# Patient Record
Sex: FEMALE | Race: WHITE | NOT HISPANIC OR LATINO | Employment: FULL TIME | URBAN - METROPOLITAN AREA
[De-identification: names, ages, dates, MRNs, and addresses within clinical notes are randomized per-mention and may not be internally consistent; named-entity substitution may affect disease eponyms.]

---

## 2017-01-19 ENCOUNTER — ALLSCRIPTS OFFICE VISIT (OUTPATIENT)
Dept: OTHER | Facility: OTHER | Age: 32
End: 2017-01-19

## 2017-06-09 ENCOUNTER — LAB REQUISITION (OUTPATIENT)
Dept: LAB | Facility: HOSPITAL | Age: 32
End: 2017-06-09
Payer: COMMERCIAL

## 2017-06-09 ENCOUNTER — ALLSCRIPTS OFFICE VISIT (OUTPATIENT)
Dept: OTHER | Facility: OTHER | Age: 32
End: 2017-06-09

## 2017-06-09 DIAGNOSIS — Z86.2 PERSONAL HISTORY OF DISEASES OF THE BLOOD AND BLOOD-FORMING ORGANS AND CERTAIN DISORDERS INVOLVING THE IMMUNE MECHANISM: ICD-10-CM

## 2017-06-09 DIAGNOSIS — R42 DIZZINESS AND GIDDINESS: ICD-10-CM

## 2017-06-09 LAB
ANION GAP SERPL CALCULATED.3IONS-SCNC: 10 MMOL/L (ref 4–13)
BASOPHILS # BLD AUTO: 0.02 THOUSANDS/ΜL (ref 0–0.1)
BASOPHILS NFR BLD AUTO: 0 % (ref 0–1)
BUN SERPL-MCNC: 14 MG/DL (ref 5–25)
CALCIUM SERPL-MCNC: 9.5 MG/DL (ref 8.3–10.1)
CHLORIDE SERPL-SCNC: 106 MMOL/L (ref 100–108)
CO2 SERPL-SCNC: 25 MMOL/L (ref 21–32)
CREAT SERPL-MCNC: 0.63 MG/DL (ref 0.6–1.3)
EOSINOPHIL # BLD AUTO: 0.17 THOUSAND/ΜL (ref 0–0.61)
EOSINOPHIL NFR BLD AUTO: 2 % (ref 0–6)
ERYTHROCYTE [DISTWIDTH] IN BLOOD BY AUTOMATED COUNT: 13.4 % (ref 11.6–15.1)
FERRITIN SERPL-MCNC: 19 NG/ML (ref 8–388)
GFR SERPL CREATININE-BSD FRML MDRD: >60 ML/MIN/1.73SQ M
GLUCOSE P FAST SERPL-MCNC: 91 MG/DL (ref 65–99)
HCT VFR BLD AUTO: 43.1 % (ref 34.8–46.1)
HGB BLD-MCNC: 14.5 G/DL (ref 11.5–15.4)
IRON SATN MFR SERPL: 11 %
IRON SERPL-MCNC: 45 UG/DL (ref 50–170)
IRON SERPL-MCNC: 45 UG/DL (ref 50–170)
LYMPHOCYTES # BLD AUTO: 1.89 THOUSANDS/ΜL (ref 0.6–4.47)
LYMPHOCYTES NFR BLD AUTO: 18 % (ref 14–44)
MCH RBC QN AUTO: 27.1 PG (ref 26.8–34.3)
MCHC RBC AUTO-ENTMCNC: 33.6 G/DL (ref 31.4–37.4)
MCV RBC AUTO: 81 FL (ref 82–98)
MONOCYTES # BLD AUTO: 0.86 THOUSAND/ΜL (ref 0.17–1.22)
MONOCYTES NFR BLD AUTO: 8 % (ref 4–12)
NEUTROPHILS # BLD AUTO: 7.48 THOUSANDS/ΜL (ref 1.85–7.62)
NEUTS SEG NFR BLD AUTO: 72 % (ref 43–75)
NRBC BLD AUTO-RTO: 0 /100 WBCS
PLATELET # BLD AUTO: 219 THOUSANDS/UL (ref 149–390)
PMV BLD AUTO: 11.3 FL (ref 8.9–12.7)
POTASSIUM SERPL-SCNC: 3.8 MMOL/L (ref 3.5–5.3)
RBC # BLD AUTO: 5.35 MILLION/UL (ref 3.81–5.12)
SODIUM SERPL-SCNC: 141 MMOL/L (ref 136–145)
TIBC SERPL-MCNC: 402 UG/DL (ref 250–450)
TSH SERPL DL<=0.05 MIU/L-ACNC: 1.57 UIU/ML (ref 0.36–3.74)
WBC # BLD AUTO: 10.45 THOUSAND/UL (ref 4.31–10.16)

## 2017-06-09 PROCEDURE — 83540 ASSAY OF IRON: CPT | Performed by: PHYSICIAN ASSISTANT

## 2017-06-09 PROCEDURE — 85025 COMPLETE CBC W/AUTO DIFF WBC: CPT | Performed by: PHYSICIAN ASSISTANT

## 2017-06-09 PROCEDURE — 82728 ASSAY OF FERRITIN: CPT | Performed by: PHYSICIAN ASSISTANT

## 2017-06-09 PROCEDURE — 80048 BASIC METABOLIC PNL TOTAL CA: CPT | Performed by: PHYSICIAN ASSISTANT

## 2017-06-09 PROCEDURE — 83550 IRON BINDING TEST: CPT | Performed by: PHYSICIAN ASSISTANT

## 2017-06-09 PROCEDURE — 84443 ASSAY THYROID STIM HORMONE: CPT | Performed by: PHYSICIAN ASSISTANT

## 2017-06-13 ENCOUNTER — GENERIC CONVERSION - ENCOUNTER (OUTPATIENT)
Dept: OTHER | Facility: OTHER | Age: 32
End: 2017-06-13

## 2017-06-13 LAB
ADDITIONAL INFORMATION (HISTORICAL): NORMAL
ADEQUACY: (HISTORICAL): NORMAL
COMMENT (HISTORICAL): NORMAL
CYTOTECHNOLOGIST: (HISTORICAL): NORMAL
GENERAL CATEGORIZATION (HISTORICAL): NORMAL
INFECTION (HISTORICAL): NORMAL
INTERPRETATION (HISTORICAL): NORMAL
LMP (HISTORICAL): NORMAL
PATHOLOGIST (HISTORICAL): NORMAL
PREV. BX: (HISTORICAL): NORMAL
PREV. PAP (HISTORICAL): NORMAL
REVIEWED BY (HISTORICAL): NORMAL
SOURCE (HISTORICAL): NORMAL
SPECIMEN STATUS REPORT (HISTORICAL): NORMAL

## 2017-06-16 ENCOUNTER — GENERIC CONVERSION - ENCOUNTER (OUTPATIENT)
Dept: OTHER | Facility: OTHER | Age: 32
End: 2017-06-16

## 2018-01-13 VITALS
DIASTOLIC BLOOD PRESSURE: 82 MMHG | RESPIRATION RATE: 17 BRPM | HEIGHT: 61 IN | WEIGHT: 157.25 LBS | BODY MASS INDEX: 29.69 KG/M2 | HEART RATE: 77 BPM | OXYGEN SATURATION: 99 % | TEMPERATURE: 99.1 F | SYSTOLIC BLOOD PRESSURE: 120 MMHG

## 2018-01-13 NOTE — RESULT NOTES
Verified Results  (1) CBC/PLT/DIFF [id number] 04:25PM Fernando Alegre Order Number: [id number]     Test Name Result Flag Reference   WBC COUNT 10 45 Thousand/uL H 4 31-10 16   RBC COUNT 5 35 Million/uL H 3 81-5 12   HEMOGLOBIN 14 5 g/dL  11 5-15 4   HEMATOCRIT 43 1 %  34 8-46  1   MCV 81 fL L 82-98   MCH 27 1 pg  26 8-34 3   MCHC 33 6 g/dL  31 4-37 4   RDW 13 4 %  11 6-15 1   MPV 11 3 fL  8 9-12 7   PLATELET COUNT 745 Thousands/uL  149-390   nRBC AUTOMATED 0 /100 WBCs     NEUTROPHILS RELATIVE PERCENT 72 %  43-75   LYMPHOCYTES RELATIVE PERCENT 18 %  14-44   MONOCYTES RELATIVE PERCENT 8 %  4-12   EOSINOPHILS RELATIVE PERCENT 2 %  0-6   BASOPHILS RELATIVE PERCENT 0 %  0-1   NEUTROPHILS ABSOLUTE COUNT 7 48 Thousands/? ??L  1 85-7 62   LYMPHOCYTES ABSOLUTE COUNT 1 89 Thousands/? ??L  0 60-4 47   MONOCYTES ABSOLUTE COUNT 0 86 Thousand/? ??L  0 17-1 22   EOSINOPHILS ABSOLUTE COUNT 0 17 Thousand/? ??L  0 00-0 61   BASOPHILS ABSOLUTE COUNT 0 02 Thousands/? ??L  0 00-0 10     (1) IRON SATURATION %, TIBC [id number] 04:25PM Fernando Alegre Order Number: [id number]     Test Name Result Flag Reference   IRON SATURATION 11 %     TOTAL IRON BINDING CAPACITY 402 ug/dL  250-450   IRON 45 ug/dL L    Patients treated with metal-binding drugs (ie  Deferoxamine) may have depressed iron values  (1) IRON [id number] 04:25PM Fernando Alegre Order Number: [id number]     Test Name Result Flag Reference   IRON 45 ug/dL L    Patients treated with metal-binding drugs (ie  Deferoxamine) may have depressed iron values  Patients treated with metal-binding drugs (ie  Deferoxamine) may have depressed iron values       (1) FERRITIN [id number] 04:25PM Fernando Alegre Order Number: [id number]     Test Name Result Flag Reference   FERRITIN 19 ng/mL  8-388     (1) TSH WITH FT4 REFLEX [id number] 04:25PM Fernando Alegre Order Number: [id number]     Test Name Result Flag Reference   TSH 1 570 uIU/mL  0 358-3 740   Patients undergoing fluorescein dye angiography may retain small amounts of fluorescein in the body for 48-72 hours post procedure  Samples containing fluorescein can produce falsely depressed TSH values  If the patient had this procedure,a specimen should be resubmitted post fluorescein clearance  The recommended reference ranges for TSH during pregnancy are as follows:  First trimester 0 1 to 2 5 uIU/mL  Second trimester  0 2 to 3 0 uIU/mL  Third trimester 0 3 to 3 0 uIU/m     (1) BASIC METABOLIC PROFILE [id number] 04:25PM [employer] Order Number: [id number]     Test Name Result Flag Reference   SODIUM 141 mmol/L  136-145   POTASSIUM 3 8 mmol/L  3 5-5 3   CHLORIDE 106 mmol/L  100-108   CARBON DIOXIDE 25 mmol/L  21-32   ANION GAP (CALC) 10 mmol/L  4-13   BLOOD UREA NITROGEN 14 mg/dL  5-25   CREATININE 0 63 mg/dL  0 60-1 30   Standardized to IDMS reference method   CALCIUM 9 5 mg/dL  8 3-10 1   eGFR Non-African American      >60 0 ml/min/1 73sq m   [facility] [employer] Disease Education Program recommendations are as follows:  GFR calculation is accurate only with a steady state creatinine  Chronic Kidney disease less than 60 ml/min/1 73 sq  meters  Kidney failure less than 15 ml/min/1 73 sq  meters     GLUCOSE FASTING 91 mg/dL  65-99       Plan  Low iron    · Ferrous Sulfate 325 (65 Fe) MG Oral Tablet; TAKE 1 TABLET DAILY

## 2018-01-15 VITALS
HEART RATE: 113 BPM | DIASTOLIC BLOOD PRESSURE: 70 MMHG | BODY MASS INDEX: 28.33 KG/M2 | RESPIRATION RATE: 16 BRPM | OXYGEN SATURATION: 98 % | TEMPERATURE: 99.4 F | SYSTOLIC BLOOD PRESSURE: 122 MMHG | HEIGHT: 61 IN | WEIGHT: 150.06 LBS

## 2018-04-25 RX ORDER — FERROUS SULFATE 325(65) MG
1 TABLET ORAL DAILY
COMMUNITY
Start: 2017-06-16 | End: 2018-10-10 | Stop reason: ALTCHOICE

## 2018-04-25 RX ORDER — MECLIZINE HYDROCHLORIDE 25 MG/1
25 TABLET ORAL EVERY 8 HOURS PRN
COMMUNITY
Start: 2017-06-09 | End: 2018-10-10 | Stop reason: ALTCHOICE

## 2018-10-10 ENCOUNTER — OFFICE VISIT (OUTPATIENT)
Dept: FAMILY MEDICINE CLINIC | Facility: CLINIC | Age: 33
End: 2018-10-10
Payer: COMMERCIAL

## 2018-10-10 VITALS
BODY MASS INDEX: 31.79 KG/M2 | WEIGHT: 161.9 LBS | HEIGHT: 60 IN | HEART RATE: 77 BPM | RESPIRATION RATE: 16 BRPM | SYSTOLIC BLOOD PRESSURE: 122 MMHG | DIASTOLIC BLOOD PRESSURE: 70 MMHG | OXYGEN SATURATION: 98 % | TEMPERATURE: 98 F

## 2018-10-10 DIAGNOSIS — L30.9 ECZEMA, UNSPECIFIED TYPE: Primary | ICD-10-CM

## 2018-10-10 PROCEDURE — 1036F TOBACCO NON-USER: CPT | Performed by: PHYSICIAN ASSISTANT

## 2018-10-10 PROCEDURE — 99213 OFFICE O/P EST LOW 20 MIN: CPT | Performed by: PHYSICIAN ASSISTANT

## 2018-10-10 NOTE — PROGRESS NOTES
Assessment/Plan:      Diagnoses and all orders for this visit:    Eczema, unspecified type  -     Crisaborole (EUCRISA) 2 % OINT; Apply topically 2 (two) times a day        Patient is a 81-year-old female presenting today for discussion of her eczema  She has had eczema the past with flares  However the past 6 months to a year she has noticed frequent flares on her hands  She states they  Could become itchy and painful  She has use hydrocortisone over-the-counter in the past but still takes a while for the flare ups to improve  I will try her on topical Eucrisa, risks vs benefits of use versus using topical steroids were discussed, she will apply topical twice a day to affected areas  She will call with any coverage issues or lack of treatment response  Chief Complaint   Patient presents with    Eczema     Patient presents for Eczema in between fingers  Patient states it has gone worse and is unsure if she needs something stronger then OTC medication  Subjective:     Patient ID: Cherie Members is a 29 y o  female  34y/o female here today for discussion of eczema  She has a hx sometimes on elbows, etc  However past 6 months getting on hands, sometimes between fingers, painful and itchy  She continues with frequent flare ups  She has used OTC hydrocortisone before which helps but not strong enough  Nearing end of flare today  Review of Systems   Constitutional: Negative  Skin:        As in HPI         The following portions of the patient's history were reviewed and updated as appropriate: allergies, current medications, past family history, past medical history, past social history, past surgical history and problem list       Objective:     Physical Exam   Constitutional: She appears well-developed and well-nourished     Skin:   B/L hands with mild dry, excoriated erythematous patches, some on sides of some fingers and between some of the fingers   Psychiatric: She has a normal mood and affect  Vitals reviewed        Vitals:    10/10/18 1635   BP: 122/70   BP Location: Left arm   Patient Position: Sitting   Cuff Size: Standard   Pulse: 77   Resp: 16   Temp: 98 °F (36 7 °C)   TempSrc: Tympanic   SpO2: 98%   Weight: 73 4 kg (161 lb 14 4 oz)   Height: 5' 0 24" (1 53 m)

## 2018-10-12 ENCOUNTER — TELEPHONE (OUTPATIENT)
Dept: FAMILY MEDICINE CLINIC | Facility: CLINIC | Age: 33
End: 2018-10-12

## 2018-10-12 NOTE — TELEPHONE ENCOUNTER
PTS INSURANCE DENIED EUCRISA  THEY WILL COVER TACROLUMUS OR PIMECROLIMUS  SHE WOULD NEED A 4 WEEK TRIAL OF ONE OF THESE  WHAT DO YOU WANT TO DO?   [facility]

## 2018-10-15 ENCOUNTER — TELEPHONE (OUTPATIENT)
Dept: FAMILY MEDICINE CLINIC | Facility: CLINIC | Age: 33
End: 2018-10-15

## 2018-10-15 DIAGNOSIS — L30.9 ECZEMA OF BOTH HANDS: Primary | ICD-10-CM

## 2018-10-15 RX ORDER — TRIAMCINOLONE ACETONIDE 5 MG/G
CREAM TOPICAL 2 TIMES DAILY
Qty: 454 G | Refills: 1 | Status: SHIPPED | OUTPATIENT
Start: 2018-10-15

## 2018-10-15 NOTE — TELEPHONE ENCOUNTER
Pt called into the office, she was waiting for prior auth on Eucrasia, but her insurance denied that RX per Gerber Drummond  However there are two that Gerber Drummond listed that will be covered Tacrolimus or Pimecrolimus  Pt will be using the [facility] pharmacy  Please advise  She is agreeing with one of the 2 prescriptions  Thank you!

## 2018-10-15 NOTE — TELEPHONE ENCOUNTER
I dont have any experience using these  I would use a topical steroid for her hands before I would use those  Please tell pt I will send topical triamcinolone steroid cream (a stronger potency steroid cream) to her [facility] pharmacy for her

## 2020-03-13 ENCOUNTER — VIRTUAL VISIT (OUTPATIENT)
Dept: FAMILY MEDICINE | Facility: OTHER | Age: 35
End: 2020-03-13

## 2020-03-13 NOTE — PROGRESS NOTES
"Date: 2020 07:39:11  Clinician: Danielle Barragan  Clinician NPI: [id number]  Patient: Milana Banerjee  Patient : 1985  Patient Address: [address] [place] [id number]  Patient Phone: [phone]  Visit Protocol: URI  Patient Summary:  Milana is a 34 year old ( : 1985 ) female who initiated a Visit for COVID-19 (Coronavirus) evaluation and screening. When asked the question \"Please sign me up to receive news, health information and promotions from [facility].\", Milana responded \"Yes\".    Milana states her symptoms started 1-2 days ago.   Her symptoms consist of malaise, a sore throat, a cough, nasal congestion, a headache, chills, and myalgia. Milana also feels feverish.   Symptom details     Nasal secretions: The color of her mucus is clear.    Cough: Milana coughs every 5-10 minutes and her cough is not more bothersome at night. Phlegm does not come into her throat when she coughs. She does not believe her cough is caused by post-nasal drip.     Sore throat: Milana reports having moderate throat pain (4-6 on a 10 point pain scale), does not have exudate on her tonsils, and can swallow liquids. She is not sure if the lymph nodes in her neck are enlarged. A rash has not appeared on the skin since the sore throat started.     Temperature: Her current temperature is 100.7 degrees Fahrenheit. Milana has had a temperature over 100 degrees Fahrenheit for 1-2 days.     Headache: She states the headache is mild (1-3 on a 10 point pain scale).      Milana denies having rhinitis, wheezing, ear pain, teeth pain, and facial pain or pressure. She also denies taking antibiotic medication for the symptoms, having recent facial or sinus surgery in the past 60 days, and having a sinus infection within the past year. She is not experiencing dyspnea.   Precipitating events  Within the past week, Milana has not been exposed to someone with strep throat. She has not recently been exposed to someone with " influenza. Milana has been in close contact with the following high risk individuals: children under the age of 5.   Pertinent COVID-19 (Coronavirus) information  Milana has not traveled internationally in the last 14 days before the start of her symptoms.   Milana has not had close contact with a laboratory confirmed positive COVID-19 patient within 14 days of symptom onset. 100.7   Pertinent medical history  Milana does not get yeast infections when she takes antibiotics.   Milana does not need a return to work/school note.   Weight: 140 lbs   Milana does not smoke or use smokeless tobacco.   She denies pregnancy and is breastfeeding. She has menstruated in the past month.   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 My symptoms started Wednesday early day. My cough is awful. My chest burns. I have the chills and a sore throat. My daughter age 4 woke up this morning with a 100.2 fever and slight cough. My son age 18 months seems out of sorts but no symptoms yet.   Weight: 140 lbs    MEDICATIONS: Tylenol Extra Strength oral, ALLERGIES: NKDA  Clinician Response:  Dear Milana,  Based on the information provided, you have influenza (the flu). The flu is a very contagious infection that can lead to a serious illness in anyone, but some are at risk for becoming more sick than others. For this reason, it is important to know you have the flu so you can take steps to prevent spreading it to others.  Medication information  I am prescribing:     Oseltamivir (Tamiflu) 75 mg oral capsule. Take 1 capsule by mouth 2 times per day for 5 days. There are no refills with this prescription.   Self care  The following tips will keep you as comfortable as possible while you recover:     Rest    Drink plenty of water and other  liquids    Take a hot shower to loosen congestion    Use throat lozenges    Gargle with warm salt water (1/4 teaspoon of salt per 8 ounce glass of water)    Suck on frozen items such as popsicles or ice cubes    Drink hot tea with lemon and honey    Take a spoonful of honey to reduce your cough     If you have a fever, stay home until your temperature has returned to normal for 24 hours and you feel well enough for daily activities. And of course, wash your hands often to prevent spreading the flu and other illnesses. However, the best way to prevent the flu is to get a flu shot before each flu season.  When to seek care  Please be seen in a clinic or urgent care if new symptoms develop, or symptoms become worse.  Call 911 or go to the emergency room if you feel that your throat is closing off, you suddenly develop a rash, you are unable to swallow fluids, you are drooling, or you are having difficulty breathing.  COVID-19 (Coronavirus) General Information  We understand it may be concerning to be ill with symptoms that overlap with COVID-19 (Coronavirus) symptoms. Below are some helpful information on COVID-19 (Coronavirus).  How can I protect myself and others from the COVID-19 (Coronavirus)?  Because there is currently no vaccine to prevent infection, the best way to protect yourself is to avoid being exposed to this virus. The CDC recommends the following additional steps:     Wash your hands often with soap and water for at least 20 seconds, especially after blowing your nose, coughing, or sneezing; going to the bathroom; and before eating or preparing food.  Use an alcohol-based hand  that contains at least 60 percent alcohol if soap and water are not available.        Avoid touching your eyes, nose and mouth with unwashed hands.    Avoid close contact with people who are sick.    Stay home when you are sick.    Cover your cough or sneeze with a tissue, then throw the tissue in the trash.    Clean and  disinfect frequently touched objects and surfaces.     You can help stop COVID-19 (Coronavirus) by knowing the signs and symptoms:     Fever    Cough    Shortness of breath     Contact your healthcare provider if   Develop symptoms   AND   Have been in close contact with a person known to have COVID-19 (Coronavirus) or live in or have recently traveled from an area with ongoing spread of COVID-19 (Coronavirus). Call ahead before you go to a doctor's office or emergency room. Tell them about your recent travel and your symptoms.   For the most up to date information, visit the CDC's website.  Steps to help prevent the spread of COVID-19 (Coronavirus) if you are sick  If you are sick with COVID-19 (Coronavirus) or suspect you are infected with the virus that causes COVID-19 (Coronavirus), follow the steps below to help prevent the disease from spreading&nbsp;to people in your home and community.     Stay home except to get medical care. Home isolation may be started in consultation with your healthcare clinician.    Separate yourself from other people and animals in your home.    Call ahead before visiting your doctor if you have a medical appointment.    Wear a facemask when you are around other people.    Cover your cough and sneezes.    Clean your hands often.    Avoid sharing personal household items.    Clean and disinfect frequently touched objects and surfaces everyday.    You will need to have someone drop off medications or household supplies (if needed) at your house without coming inside or in contact with you or others living in your house.    Monitor your symptoms and seek prompt medical care if your illness is worsening (e.g. Difficulty breathing).    Discontinue home isolation only in consultation with your healthcare provider.     For more detailed and up to date information on what to do if you are sick, visit this link: What to Do If You Are Sick With Coronavirus Disease 2019 (COVID-19).  Do I need  "to be tested for COVID-19 (Coronavirus)?     At this time, the limited number of tests available are controlled by the state and local health departments and are being reserved for more seriously ill patients, those with known exposure to confirmed patients, and those with recent travel (within 14 days) to countries with high rates of COVID-19 (Coronavirus).    Decisions on which patients receive testing will be based on the local spread of COVID-19 (Coronavirus) as well as the symptoms. Your healthcare provider will make the final decision on whether you should be tested.    In the meantime, if you have concerns that you may have been exposed, it is reasonable to practice \"social distancing.\"&nbsp; If you are ill with a cold or flu like illness, please monitor your symptoms and reach out to your healthcare provider if your symptoms worsen.    For more up to date information, visit this link: COVID-19 (Coronavirus) Frequently Asked Questions and Answers.      Diagnosis: Influenza  Diagnosis ICD: J11.1  Prescription: oseltamivir (Tamiflu) 75 mg oral capsule 10 capsule, 5 days supply. Take 1 capsule by mouth 2 times per day for 5 days. Refills: 0, Refill as needed: no, Allow substitutions: yes  Pharmacy: [id number] - [phone] - [address] [place] [address]  "

## 2023-07-05 ENCOUNTER — TRANSCRIBE ORDERS (OUTPATIENT)
Dept: OTHER | Age: 38
End: 2023-07-05

## 2023-07-05 DIAGNOSIS — M54.2 CERVICAL PAIN: Primary | ICD-10-CM

## 2023-08-03 ENCOUNTER — THERAPY VISIT (OUTPATIENT)
Dept: PHYSICAL THERAPY | Facility: CLINIC | Age: 38
End: 2023-08-03
Attending: PHYSICIAN ASSISTANT
Payer: COMMERCIAL

## 2023-08-03 DIAGNOSIS — M54.2 CERVICAL PAIN: ICD-10-CM

## 2023-08-03 DIAGNOSIS — M54.2 NECK PAIN: Primary | ICD-10-CM

## 2023-08-03 PROCEDURE — 97140 MANUAL THERAPY 1/> REGIONS: CPT | Mod: GP | Performed by: PHYSICAL THERAPIST

## 2023-08-03 PROCEDURE — 97110 THERAPEUTIC EXERCISES: CPT | Mod: GP | Performed by: PHYSICAL THERAPIST

## 2023-08-03 PROCEDURE — 97161 PT EVAL LOW COMPLEX 20 MIN: CPT | Mod: GP | Performed by: PHYSICAL THERAPIST

## 2023-08-03 NOTE — PROGRESS NOTES
"PHYSICAL THERAPY EVALUATION  Type of Visit: Evaluation    See electronic medical record for Abuse and Falls Screening details.    Subjective       Presenting condition or subjective complaint: Neck pain and headaches.  Date of onset:  (Ongoing for past 2 years.)    Relevant medical history:     Dates & types of surgery:      Prior diagnostic imaging/testing results: -- (None)     Prior therapy history for the same diagnosis, illness or injury: Yes Chiropractor a couple of times.  Temporary relief.    Prior Level of Function  Transfers: Independent  Ambulation: Independent  ADL: Independent    Living Environment  Social support: With a significant other or spouse   Type of home: House   Stairs to enter the home: No       Ramp: No   Stairs inside the home: Yes 8 Is there a railing: Yes   Help at home: None  Equipment owned:       Employment: Yes [employer]  Hobbies/Interests: Walking, reading, playing with kids.    Patient goals for therapy: Just not have the pain. Exercise.    Pain assessment: Location: R neck and upper trapezius area/Rating: Currently 1-2/10, increases to 5/10     Objective   CERVICAL SPINE EVALUATION  PAIN: Pain Level at Rest: 2/10  Pain Level with Use: 5/10  Pain Location: cervical spine  Pain Quality: Burning, Nagging, and tightness.  Reports upper body \"feels crunched\"  Pain Frequency: Symptoms constant with intermittent flare ups  Pain is Worst: Symptoms worsen as the day progresses  Pain is Exacerbated By: Patient reports pain increases with activity throughout the day.  Notices pain with exercise and turing head while driving.  Nothing specific seems to increase symptoms.  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  Describes symptoms as tightness, the head feeling heavy, and the upper body feeling crunched.    Pain quality: tightness  Paresthesia (yes/no):  no    Symptoms (improving/unchanging/worsening):  worsening  Symptoms commenced as a result of: unknown   Condition occurred in the following environment:  home    Symptoms at onset (neck/arm/forearm/headache):   Constant symptoms (neck/arm/forearm/headache): neck  Intermittent symptoms (neck/arm/forearm/headache): head, upper back    Symptoms are made worse with the following: Patient reports no specific pattern to symptoms except they increase with activity as the day progresses.  Notices symptoms and strain when exercising and driving, but does not think these activities increase or bring the symptoms on.   Symptoms are made better with the following: Heat provides temporary relief at the end of the day.    Disturbed sleep (yes/no): no  Number of pillows: 1  Sleeping postures (prone/sup/side R/L):     Previous episodes (0/1-5/6-10/11+): 0 Year of first episode: about 2 years ago    Previous history: no history of injury or previous symptoms  Previous treatments: a few chiropractic visits, temporary relief    Specific Questions: (as reported by the patient)  Dizziness/Tinnitus/Nausea/Swallowing (pos/neg): no  Gait/Upper Limbs (normal/abnormal): normal  Medications (nil/NSAIDS/anlag/steroids/anticoag/other):  None  Medical allergies:  none  General health (excellent/good/fair/poor):  Good  Pertinent medical history:  None  Imaging (None/Xray/MRI/Other):  none  Recent or major surgery (yes/no): no  Night pain (yes/no): no  Accidents  (yes/no): no  Unexplained weight loss (yes/no): no  Barriers at home: no  Other red flags: no    Postural Observation:   Sitting: Slump  Protruded head: Yes Lateral deviation:  Nil.  Change of posture: Better Wry Neck: Nil  Other observations/functional baselines:      Neurological:  Motor Deficit:  UE equal B  Reflexes:    Sensory Deficit:     Neurodynamic tests:      Movement Loss:   Erlin Mod Min Nil Symptoms   Protrusion    x NE   Flexion    x B tightness   Retraction   x  Tightness R>L   Extension    x NE   Lateral flexion R  x   Pn on R, tight on L   Lateral flexion L    x Tight on R   Rotation R   x  Tight on R   Rotation L   x  Tight on R     Test Movements:   During: produces, abolishes, increases, decreases, no effect, centralizing, peripheralizing  After: better, worse, no better, no worse, no effect, centralized, peripheralized    Symptomatic response Mechanical response    During testing After testing Effect - increased ROM, decreased ROM, or key functional test No Effect   Pretest symptoms sittin-2/10 pain R neck     Rep PRO          Rep RET Increases    Worse, B soreness/burning    Inc R SB ROM, inc ease with rotation    Rep RET EXT No Effect    No Effect    NE             Pretest symptoms lying:     During testing After testing Effect - increased ROM, decreased ROM, or key functional test No Effect   Rep RET         Rep RET EXT           If required, pretest symptoms sitting:        During testing After testing Effect - increased ROM, decreased ROM, or key functional test No Effect   Rep LF-R         Rep LF-L         Rep ROT-R         Rep ROT-L         Rep FLEX           Static Tests:      Other Tests: Weakness present in middle and lower trapezius B, 3/5.    Provisional Classification:      Potential Drivers of Pain and/or Disability:     Principle of Management:  Education:  Posture    Equipment provided:    Mechanical therapy (Y/N):  y   Extension principle:  seated ret, seated  ret/ext     Lateral principle:    Flexion principle:       Other:    MYOTOMES: WNL  DTR S:   CORD SIGNS:   DERMATOMES:   NEURAL TENSION:   FLEXIBILITY: WNL   SPECIAL TESTS:   PALPATION:   + Tenderness At Location Left Right   Sternocleidomastoid + +   Scalenes     Rhomboids     Facet     Upper Trap - +   Levator + +   Erector Spinae + +   Suboccipitals + +     SPINAL SEGMENTAL CONCLUSIONS:       Assessment & Plan   CLINICAL IMPRESSIONS  Medical Diagnosis: Neck pain    Treatment Diagnosis: Neck pain   Impression/Assessment: Patient is a 37 year old female with neck complaints.  The following significant findings have been identified: Pain, Decreased ROM/flexibility, Decreased strength, and Decreased activity tolerance. These impairments interfere with their ability to perform recreational activities and driving  as compared to previous level of function.     Clinical Decision Making (Complexity):  Clinical Presentation: Stable/Uncomplicated  Clinical Presentation Rationale: based on medical and personal factors listed in PT evaluation  Clinical Decision Making (Complexity): Low complexity    PLAN OF CARE  Treatment Interventions:  Interventions: Manual Therapy, Therapeutic Activity, Therapeutic Exercise, Self-Care/Home Management    Long Term Goals     PT Goal 1  Goal Identifier: Driving  Goal Description: Patient will be able to drive 15-20 minutes as needed for errands with 0/10 pain  Rationale: to maximize safety and independence with transportation;to maximize safety and independence within the community  Goal Progress: Patient reports up to 5/10 with driving and turning head while driving to complete errands.  Target Date: 09/28/23      Frequency of Treatment: 1x/wk  Duration of Treatment: 8 weeks    Recommended Referrals to Other Professionals:   Education Assessment:   Learner/Method: Patient;Pictures/Video;Demonstration;Listening    Risks and benefits of evaluation/treatment have been explained.    Patient/Family/caregiver agrees with Plan of Care.     Evaluation Time:     PT Pily, Low Complexity Minutes (87600): 15       Signing Clinician: Jennifer Acuña PT

## 2023-08-10 ENCOUNTER — THERAPY VISIT (OUTPATIENT)
Dept: PHYSICAL THERAPY | Facility: CLINIC | Age: 38
End: 2023-08-10
Attending: PHYSICIAN ASSISTANT
Payer: COMMERCIAL

## 2023-08-10 DIAGNOSIS — M54.2 NECK PAIN: Primary | ICD-10-CM

## 2023-08-10 PROCEDURE — 97140 MANUAL THERAPY 1/> REGIONS: CPT | Mod: GP | Performed by: PHYSICAL THERAPIST

## 2023-08-10 PROCEDURE — 97110 THERAPEUTIC EXERCISES: CPT | Mod: GP | Performed by: PHYSICAL THERAPIST

## 2023-08-23 ENCOUNTER — THERAPY VISIT (OUTPATIENT)
Dept: PHYSICAL THERAPY | Facility: CLINIC | Age: 38
End: 2023-08-23
Payer: COMMERCIAL

## 2023-08-23 DIAGNOSIS — M54.2 NECK PAIN: Primary | ICD-10-CM

## 2023-08-23 PROCEDURE — 97110 THERAPEUTIC EXERCISES: CPT | Mod: GP | Performed by: PHYSICAL THERAPIST

## 2023-08-23 PROCEDURE — 97140 MANUAL THERAPY 1/> REGIONS: CPT | Mod: GP | Performed by: PHYSICAL THERAPIST

## 2023-09-01 ENCOUNTER — THERAPY VISIT (OUTPATIENT)
Dept: PHYSICAL THERAPY | Facility: CLINIC | Age: 38
End: 2023-09-01
Payer: COMMERCIAL

## 2023-09-01 DIAGNOSIS — M54.2 NECK PAIN: Primary | ICD-10-CM

## 2023-09-01 PROCEDURE — 97110 THERAPEUTIC EXERCISES: CPT | Mod: GP | Performed by: PHYSICAL THERAPIST

## 2023-09-01 PROCEDURE — 97140 MANUAL THERAPY 1/> REGIONS: CPT | Mod: GP | Performed by: PHYSICAL THERAPIST

## 2023-10-25 NOTE — PROGRESS NOTES
09/01/23 0500   Appointment Info   Signing clinician's name / credentials Eren Nayak DPT   Total/Authorized Visits 8(E&T)   Visits Used 4   Medical Diagnosis Neck pain   PT Tx Diagnosis Neck pain   Progress Note/Certification   Onset of illness/injury or Date of Surgery   (Ongoing for past 2 years.)   Therapy Frequency 1x/wk   Predicted Duration 8 weeks   Progress Note Due Date 10/01/23       Present No   PT Goal 1   Goal Identifier Driving   Goal Description Patient will be able to drive 15-20 minutes as needed for errands with 0/10 pain   Rationale to maximize safety and independence with transportation;to maximize safety and independence within the community   Goal Progress Patient reports up to 5/10 with driving aPatient continues to report up to 4/10 with driving and turning head while driving to complete errands.   Target Date 09/28/23   Subjective Report   Subjective Report pt reports she did not do anything all week went to the [facility] and [facility] 2 x so did no exercises. Does not like it when it hurts.   Objective Measures   Objective Measures Objective Measure 1   Objective Measure 1   Objective Measure CROM   Details Nil loss B rot with end range tightness on the R.  Min/Mod loss R SB with R sided pain and L sided tightness.   Treatment Interventions (PT)   Interventions Therapeutic Activity;Manual Therapy;Therapeutic Procedure/Exercise   Therapeutic Procedure/Exercise   Therapeutic Procedures: strength, endurance, ROM, flexibillity minutes (71835) 25   Ther Proc 1 discussed Home exercises. reducing frequency as hitting some form of upper back/ legs everyday. or discussed modification to reduce strain on upper back with rows / reverse flys.   PTRx Ther Proc 1 reveiwed the concepts of repeated movmeents. starting flexion / retraction with education on progression if no ROM improvement or changes in pain eventually progress to side bends   PTRx Ther Proc 2 if no change over 2  weeks with repeated movmenets educated on concepts of UT and shoulder strengthening. okay to feel disomfort as long as more fatigue is felt then pain and tracking workouts to assess progressive overload and tolerance.   Skilled Intervention two weeks out, start with repeated movements and get some consistency if no change or DP then educated patient on upper body strengthening routine to follow with how to progressively add weight.   Patient Response/Progress pt tolerating exercies with no lasting pain   PTRx Ther Proc 10 Supine Retraction Off Table   PTRx Ther Proc 10 - Details 10 x 5 sec   PTRx Ther Proc 11 Shoulder Theraband External Rotation   PTRx Ther Proc 11 - Details x 10 RTB   PTRx Ther Proc 12 Shoulder Theraband Internal Rotation   PTRx Ther Proc 12 - Details x 10 RTB    Manual Therapy   Manual Therapy: Mobilization, MFR, MLD, friction massage minutes (47858) 15   Manual Therapy Manual Therapy 2   Manual Therapy 1 STM   Manual Therapy 1 - Details STM to B UT, right cervical paraspinals, SCM.   Skilled Intervention STM and joint mobilization for pain control and increase flexibility.   Patient Response/Progress Dec tightness   Education   Learner/Method Patient;Pictures/Video;Demonstration;Listening   Plan   Home program complete MDT consistently, no change strengthening.   Updates to plan of care 2 x/ month vs weekly.   Plan for next session if no change with strengthening develop Upper body strengthening routine for next month   Total Session Time   Timed Code Treatment Minutes 40   Total Treatment Time (sum of timed and untimed services) 40         DISCHARGE  Reason for Discharge: Patient chooses to discontinue therapy.    Equipment Issued:     Discharge Plan: Patient to continue home program.    Referring Provider:  Toya Montelongo

## 2025-03-08 ENCOUNTER — HEALTH MAINTENANCE LETTER (OUTPATIENT)
Age: 40
End: 2025-03-08

## 2025-06-10 NOTE — PROGRESS NOTES
Assessment & Plan  Well female exam with routine gynecological exam  Contraception: condoms  Cervical cancer screening: pap up to date  STD screening: declines  Gardasil vaccine: info given         Family history of breast cancer    Orders:    Ambulatory Referral to Genetics; Future        CHIEF COMPLAINT:    Patient here for ROUTINE GYN EXAM.    SUBJECTIVE:    Patient is 39 y.o. year old female G 1 P 1.  She is here today for her routine gyn exam. She is using condoms for birth control.     Menses every 28 days, duration x 5 days.  Pt does not smoke.  She denies alcohol/drug abuse.  She denies domestic violence/abuse.  She declines STD/HIV testing.  She denies problems with her breasts, bladder, or bowel.      Labs done last year for PCOS due to Family history, all normal.  Patient with No hx of skipping menses.   Will f/u with PCP for recheck of lipids/ glucose    Pt with no history of abnormal pap smears.  We discussed the current ACOG pap guidelines.  Last pap was 7/18/22 and results were negative, HPV HR negative.    The patients medical, surgical, and family history was reviewed and updated.     Gardasil: not completed    Domestic violence screen: negative    She has no complaints today.       Family History breast cancer:  p. Aunt @ 30's  Family History ovarian cancer: no  Family History colon cancer: no    Medications Ordered Prior to Encounter[1]    Allergies[2]    Past Surgical History[3]        Review of Systems   Constitutional: Negative.    HENT: Negative.    Eyes: Negative.    Respiratory: Negative.    Cardiovascular: Negative.    Gastrointestinal: Negative.    Endocrine: Negative.    Genitourinary:        As noted in HPI   Musculoskeletal: Negative.    Skin: Negative.    Allergic/Immunologic: Negative.    Neurological: Negative.    Hematological: Negative.    Psychiatric/Behavioral: Negative    OBJECTIVE:    Vitals:    06/12/25 0717   BP: 108/78   Patient Position: Sitting   Cuff Size: Standard  "  Weight: 77.5 kg (170 lb 12.8 oz)   Height: 5' 1\" (1.549 m)       Chaperone was present during exam.    EXAM:    Neck: supple without nodes or thyromegaly  Heart: regular rate and rhythm  Lungs: clear to auscultation without rales, rhonci  Breasts: nontender, no masses, no discoloration, no discharge  Abdomen: soft, nontender, no masses  Ext genitalia: no lesions, no discoloration  Urethra: no discharge or erythema  Bladder: nontender, good support  Vgina: no discharge, no lesions  Cervix: no lesions, no cervical motion tenderness  Adnexa: nontender, no masses  Uterus: nontender, normal size and shape                 [1]   Current Outpatient Medications on File Prior to Visit   Medication Sig Dispense Refill    triamcinolone (KENALOG) 0.5 % cream Apply topically 2 (two) times a day For eczema 454 g 1     No current facility-administered medications on file prior to visit.   [2] No Known Allergies  [3]   Past Surgical History:  Procedure Laterality Date    DENTAL SURGERY       "

## 2025-06-12 ENCOUNTER — ANNUAL EXAM (OUTPATIENT)
Dept: OBGYN CLINIC | Facility: CLINIC | Age: 40
End: 2025-06-12
Payer: COMMERCIAL

## 2025-06-12 VITALS
HEIGHT: 61 IN | SYSTOLIC BLOOD PRESSURE: 108 MMHG | WEIGHT: 170.8 LBS | BODY MASS INDEX: 32.25 KG/M2 | DIASTOLIC BLOOD PRESSURE: 78 MMHG

## 2025-06-12 DIAGNOSIS — Z01.419 WELL FEMALE EXAM WITH ROUTINE GYNECOLOGICAL EXAM: Primary | ICD-10-CM

## 2025-06-12 DIAGNOSIS — Z80.3 FAMILY HISTORY OF BREAST CANCER: ICD-10-CM

## 2025-06-12 PROCEDURE — S0610 ANNUAL GYNECOLOGICAL EXAMINA: HCPCS | Performed by: PHYSICIAN ASSISTANT

## 2025-06-12 RX ORDER — FLUOCINONIDE TOPICAL SOLUTION USP, 0.05% 0.5 MG/ML
SOLUTION TOPICAL
COMMUNITY
Start: 2025-05-09

## 2025-06-12 RX ORDER — KETOCONAZOLE 20 MG/ML
SHAMPOO, SUSPENSION TOPICAL
COMMUNITY
Start: 2025-05-09

## 2025-07-29 ENCOUNTER — TELEPHONE (OUTPATIENT)
Dept: ADMINISTRATIVE | Facility: OTHER | Age: 40
End: 2025-07-29

## 2025-07-31 ENCOUNTER — OFFICE VISIT (OUTPATIENT)
Dept: FAMILY MEDICINE CLINIC | Facility: CLINIC | Age: 40
End: 2025-07-31
Payer: COMMERCIAL

## 2025-07-31 VITALS
WEIGHT: 171 LBS | SYSTOLIC BLOOD PRESSURE: 130 MMHG | TEMPERATURE: 98.1 F | OXYGEN SATURATION: 98 % | DIASTOLIC BLOOD PRESSURE: 74 MMHG | HEART RATE: 102 BPM | BODY MASS INDEX: 32.28 KG/M2 | HEIGHT: 61 IN

## 2025-07-31 DIAGNOSIS — Z83.719 FAMILY HISTORY OF POLYPS IN THE COLON: ICD-10-CM

## 2025-07-31 DIAGNOSIS — Z00.00 ANNUAL PHYSICAL EXAM: Primary | ICD-10-CM

## 2025-07-31 DIAGNOSIS — L65.9 HAIR LOSS: ICD-10-CM

## 2025-07-31 DIAGNOSIS — E66.9 OBESITY (BMI 30-39.9): ICD-10-CM

## 2025-07-31 DIAGNOSIS — E78.5 DYSLIPIDEMIA: ICD-10-CM

## 2025-07-31 PROBLEM — L21.9 SEBORRHEIC DERMATITIS OF SCALP: Status: ACTIVE | Noted: 2024-05-15

## 2025-07-31 PROCEDURE — 99385 PREV VISIT NEW AGE 18-39: CPT | Performed by: FAMILY MEDICINE

## 2025-07-31 RX ORDER — ROFLUMILAST 3 MG/G
AEROSOL, FOAM TOPICAL
COMMUNITY
Start: 2025-06-27